# Patient Record
Sex: FEMALE | Race: WHITE | Employment: FULL TIME | URBAN - METROPOLITAN AREA
[De-identification: names, ages, dates, MRNs, and addresses within clinical notes are randomized per-mention and may not be internally consistent; named-entity substitution may affect disease eponyms.]

---

## 2021-07-07 ENCOUNTER — HOSPITAL ENCOUNTER (EMERGENCY)
Facility: CLINIC | Age: 26
Discharge: HOME | End: 2021-07-07
Attending: EMERGENCY MEDICINE | Admitting: EMERGENCY MEDICINE

## 2021-07-07 VITALS
TEMPERATURE: 99.9 F | HEART RATE: 101 BPM | DIASTOLIC BLOOD PRESSURE: 77 MMHG | BODY MASS INDEX: 40.29 KG/M2 | WEIGHT: 236 LBS | SYSTOLIC BLOOD PRESSURE: 117 MMHG | HEIGHT: 64 IN | RESPIRATION RATE: 20 BRPM | OXYGEN SATURATION: 97 %

## 2021-07-07 DIAGNOSIS — R11.2 NON-INTRACTABLE VOMITING WITH NAUSEA, UNSPECIFIED VOMITING TYPE: ICD-10-CM

## 2021-07-07 LAB
ALBUMIN SERPL-MCNC: 3.5 G/DL (ref 3.4–5)
ALBUMIN UR-MCNC: 20 MG/DL
ALP SERPL-CCNC: 33 U/L (ref 40–150)
ALT SERPL W P-5'-P-CCNC: 22 U/L (ref 0–50)
ANION GAP SERPL CALCULATED.3IONS-SCNC: 7 MMOL/L (ref 3–14)
APPEARANCE UR: ABNORMAL
AST SERPL W P-5'-P-CCNC: 12 U/L (ref 0–45)
BASOPHILS # BLD AUTO: 0 10E9/L (ref 0–0.2)
BASOPHILS NFR BLD AUTO: 0.2 %
BILIRUB SERPL-MCNC: 0.4 MG/DL (ref 0.2–1.3)
BILIRUB UR QL STRIP: NEGATIVE
BUN SERPL-MCNC: 14 MG/DL (ref 7–30)
CALCIUM SERPL-MCNC: 9 MG/DL (ref 8.5–10.1)
CHLORIDE SERPL-SCNC: 106 MMOL/L (ref 94–109)
CO2 SERPL-SCNC: 24 MMOL/L (ref 20–32)
COLOR UR AUTO: YELLOW
CREAT SERPL-MCNC: 0.7 MG/DL (ref 0.52–1.04)
DIFFERENTIAL METHOD BLD: NORMAL
EOSINOPHIL # BLD AUTO: 0 10E9/L (ref 0–0.7)
EOSINOPHIL NFR BLD AUTO: 0.1 %
ERYTHROCYTE [DISTWIDTH] IN BLOOD BY AUTOMATED COUNT: 12.3 % (ref 10–15)
GFR SERPL CREATININE-BSD FRML MDRD: >90 ML/MIN/{1.73_M2}
GLUCOSE SERPL-MCNC: 105 MG/DL (ref 70–99)
GLUCOSE UR STRIP-MCNC: NEGATIVE MG/DL
HCG SERPL QL: NEGATIVE
HCT VFR BLD AUTO: 35.6 % (ref 35–47)
HGB BLD-MCNC: 11.8 G/DL (ref 11.7–15.7)
HGB UR QL STRIP: ABNORMAL
IMM GRANULOCYTES # BLD: 0 10E9/L (ref 0–0.4)
IMM GRANULOCYTES NFR BLD: 0.1 %
KETONES UR STRIP-MCNC: NEGATIVE MG/DL
LEUKOCYTE ESTERASE UR QL STRIP: ABNORMAL
LIPASE SERPL-CCNC: 51 U/L (ref 73–393)
LYMPHOCYTES # BLD AUTO: 1.2 10E9/L (ref 0.8–5.3)
LYMPHOCYTES NFR BLD AUTO: 12.8 %
MCH RBC QN AUTO: 28.4 PG (ref 26.5–33)
MCHC RBC AUTO-ENTMCNC: 33.1 G/DL (ref 31.5–36.5)
MCV RBC AUTO: 86 FL (ref 78–100)
MONOCYTES # BLD AUTO: 0.4 10E9/L (ref 0–1.3)
MONOCYTES NFR BLD AUTO: 3.9 %
MUCOUS THREADS #/AREA URNS LPF: PRESENT /LPF
NEUTROPHILS # BLD AUTO: 7.5 10E9/L (ref 1.6–8.3)
NEUTROPHILS NFR BLD AUTO: 82.9 %
NITRATE UR QL: NEGATIVE
NRBC # BLD AUTO: 0 10*3/UL
NRBC BLD AUTO-RTO: 0 /100
PH UR STRIP: 6 PH (ref 5–7)
PLATELET # BLD AUTO: 387 10E9/L (ref 150–450)
POTASSIUM SERPL-SCNC: 3.6 MMOL/L (ref 3.4–5.3)
PROT SERPL-MCNC: 7.8 G/DL (ref 6.8–8.8)
RBC # BLD AUTO: 4.15 10E12/L (ref 3.8–5.2)
RBC #/AREA URNS AUTO: 2 /HPF (ref 0–2)
SODIUM SERPL-SCNC: 137 MMOL/L (ref 133–144)
SOURCE: ABNORMAL
SP GR UR STRIP: 1.03 (ref 1–1.03)
SQUAMOUS #/AREA URNS AUTO: 16 /HPF (ref 0–1)
UROBILINOGEN UR STRIP-MCNC: 0 MG/DL (ref 0–2)
WBC # BLD AUTO: 9 10E9/L (ref 4–11)
WBC #/AREA URNS AUTO: 5 /HPF (ref 0–5)

## 2021-07-07 PROCEDURE — 81001 URINALYSIS AUTO W/SCOPE: CPT | Performed by: EMERGENCY MEDICINE

## 2021-07-07 PROCEDURE — 99284 EMERGENCY DEPT VISIT MOD MDM: CPT | Mod: 25

## 2021-07-07 PROCEDURE — 84703 CHORIONIC GONADOTROPIN ASSAY: CPT | Performed by: EMERGENCY MEDICINE

## 2021-07-07 PROCEDURE — 83690 ASSAY OF LIPASE: CPT | Performed by: EMERGENCY MEDICINE

## 2021-07-07 PROCEDURE — 85025 COMPLETE CBC W/AUTO DIFF WBC: CPT | Performed by: EMERGENCY MEDICINE

## 2021-07-07 PROCEDURE — 258N000003 HC RX IP 258 OP 636: Performed by: EMERGENCY MEDICINE

## 2021-07-07 PROCEDURE — 80053 COMPREHEN METABOLIC PANEL: CPT | Performed by: EMERGENCY MEDICINE

## 2021-07-07 PROCEDURE — 96360 HYDRATION IV INFUSION INIT: CPT

## 2021-07-07 RX ORDER — SODIUM CHLORIDE 9 MG/ML
INJECTION, SOLUTION INTRAVENOUS CONTINUOUS
Status: DISCONTINUED | OUTPATIENT
Start: 2021-07-07 | End: 2021-07-07 | Stop reason: HOSPADM

## 2021-07-07 RX ORDER — ONDANSETRON 4 MG/1
4 TABLET, ORALLY DISINTEGRATING ORAL EVERY 8 HOURS PRN
Qty: 10 TABLET | Refills: 0 | Status: SHIPPED | OUTPATIENT
Start: 2021-07-07 | End: 2021-07-10

## 2021-07-07 RX ADMIN — SODIUM CHLORIDE 1000 ML: 9 INJECTION, SOLUTION INTRAVENOUS at 04:42

## 2021-07-07 SDOH — HEALTH STABILITY: MENTAL HEALTH: HOW OFTEN DO YOU HAVE A DRINK CONTAINING ALCOHOL?: NEVER

## 2021-07-07 ASSESSMENT — ENCOUNTER SYMPTOMS
HEADACHES: 1
NAUSEA: 1
DIZZINESS: 1
FEVER: 0
LIGHT-HEADEDNESS: 1
DIARRHEA: 0
DYSURIA: 0
WEAKNESS: 0
FREQUENCY: 0
CHILLS: 0
VOMITING: 1
SHORTNESS OF BREATH: 0
ABDOMINAL PAIN: 1
PALPITATIONS: 1
NERVOUS/ANXIOUS: 1
NUMBNESS: 0
DIFFICULTY URINATING: 0

## 2021-07-07 ASSESSMENT — MIFFLIN-ST. JEOR: SCORE: 1790.49

## 2021-07-07 NOTE — ED PROVIDER NOTES
History   Chief Complaint:  Nausea and Vomiting     HPI   Kirstie Kraus is a 26 year old female who presents with nausea and vomiting. Earlier tonight, the patient states that she experienced the gradual onset of nausea, vomiting, headache, lightheadedness, and dizziness. In addition, she states that she experienced the onset of abdominal pain, bloating, palpitations, and anxiety, prompting her to present to the emergency department. The patient reports that she has a history of panic disorder and states that she gets bouts of intense nausea with anxiety during her periods every few months. She also reports some concern for pregnancy. She denies any chest pain, shortness of breath, diarrhea, numbness, weakness, fever, chills, urinary symptoms, or any other symptoms. Of note, she reports that she is from [place] and has been seen for similar symptoms in the past.       Review of Systems   Constitutional: Negative for chills and fever.   Respiratory: Negative for shortness of breath.    Cardiovascular: Positive for palpitations. Negative for chest pain.   Gastrointestinal: Positive for abdominal pain, nausea and vomiting. Negative for diarrhea.   Genitourinary: Negative for difficulty urinating, dysuria, frequency and urgency.   Neurological: Positive for dizziness, light-headedness and headaches. Negative for weakness and numbness.   Psychiatric/Behavioral: The patient is nervous/anxious.    All other systems reviewed and are negative.        Allergies:  Penicillin  Sulfa drugs    Medications:  The patient denies taking any prescription medications.    Past Medical History:    The patient denies any past medical history incuding diabetes mellitus type II.     Surgical History:  Tonsillectomy     Social History:  Drug Use: Negative  Presents to the ED alone.    Physical Exam     Patient Vitals for the past 24 hrs:   BP Temp Temp src Pulse Resp SpO2 Height Weight   07/07/21 0515 117/77 -- -- 101 -- 97 % --  "--   07/07/21 0412 -- 99.9  F (37.7  C) Oral 120 20 97 % 1.626 m (5' 4\") 107 kg (236 lb)       Physical Exam  General: Appears well-developed and well-nourished.   Head: No signs of trauma.   Mouth/Throat: Oropharynx is clear and moist.   CV: Mild tachycardia and regular rhythm.    Resp: Effort normal and breath sounds normal. No respiratory distress.   GI: Soft. There is no tenderness.  No rebound or guarding.  Normal bowel sounds.  No CVA tenderness.  MSK: Normal range of motion.   Neuro: The patient is alert and oriented. Speech normal.  Skin: Skin is warm and dry. No rash noted.   Psych: normal mood and affect. behavior is normal.     Emergency Department Course     Laboratory:     CBC: WBC 9.0, HGB 11.8,     CMP: Glucose 105 (L), Alkphos 33 (L), o/w WNL (Creatinine 0.70)     Lipase: 51 (L)    HCG Qualitative: Negative     UA with microscopic: Blood Small (A), Protein Albumin 20 (A), Leukocyte Esterase Trace (A), Squamous Epithelial 16 (H), Mucous Present (A), o/w WNL     Emergency Department Course:    Reviewed:  I reviewed nursing notes, vitals and past medical history.    Assessments:  0421 I obtained history and examined the patient as noted above.     0527 I rechecked the patient and explained findings.     Interventions:  0442 NS, 1 L, IV    Disposition:  The patient was discharged to home.       Impression & Plan     Medical Decision Making:  Kirstie Kraus is a 26-year-old woman who presents due to nausea and vomiting.  She states that she has had nausea, vomiting, lightheadedness, and not feeling well particular through the day.  She states that, approximately once a month, she gets these symptoms and thinks it is related to her menstrual cycle.  She has a history of anxiety and states that the symptoms seem to bring on anxiety.  She reports that she has been seen in the hospital for this on a few occasions in the past in [place] where she is from.  On my evaluation her abdominal exam " was overall benign.  Blood work was obtained that was reassuring with normal white blood cell count and electrolytes and the patient's pregnancy test came back negative.  I offered her Zofran, but the patient did not want this.  She is overall feeling better in the ER.  Patient was ultimately discharged with a prescription for Zofran so that she would have it in case she had any further nausea and was recommended to continue with Tylenol and ibuprofen.  I did recommend that she follow-up with her gynecologist given the apparent association with her monthly cycle and she was instructed to return for any further concerns or worsening symptoms.      Covid-19  Kirstie Kraus was evaluated during a global COVID-19 pandemic, which necessitated consideration that the patient might be at risk for infection with the SARS-CoV-2 virus that causes COVID-19.   Applicable protocols for evaluation were followed during the patient's care.     Diagnosis:    ICD-10-CM    1. Non-intractable vomiting with nausea, unspecified vomiting type  R11.2        Discharge Medications:  Discharge Medication List as of 7/7/2021  5:44 AM      START taking these medications    Details   ondansetron (ZOFRAN ODT) 4 MG ODT tab Take 1 tablet (4 mg) by mouth every 8 hours as needed for nausea, Disp-10 tablet, R-0, Local Print             Scribe Disclosure:  I, Irvin Bowman, am serving as a scribe on 7/7/2021 at 4:28 AM to personally document services performed by Chaim Brewer MD based on my observations and the provider's statements to me.              Chaim Brewer MD  07/07/21 0636

## 2021-07-07 NOTE — ED TRIAGE NOTES
Patient here with nausea,vomiting and dizziness which started tonight. She c/o abdominal pain with bloating. She also c/o anxiety